# Patient Record
Sex: FEMALE | Race: WHITE | Employment: FULL TIME | URBAN - METROPOLITAN AREA
[De-identification: names, ages, dates, MRNs, and addresses within clinical notes are randomized per-mention and may not be internally consistent; named-entity substitution may affect disease eponyms.]

---

## 2020-02-21 ENCOUNTER — OFFICE VISIT (OUTPATIENT)
Dept: SURGERY | Age: 50
End: 2020-02-21

## 2020-02-21 VITALS
DIASTOLIC BLOOD PRESSURE: 48 MMHG | BODY MASS INDEX: 23.99 KG/M2 | HEART RATE: 86 BPM | HEIGHT: 65 IN | WEIGHT: 144 LBS | SYSTOLIC BLOOD PRESSURE: 121 MMHG

## 2020-02-21 DIAGNOSIS — D24.1 PAPILLOMA OF RIGHT BREAST: Primary | ICD-10-CM

## 2020-02-21 RX ORDER — LEVOTHYROXINE SODIUM 75 UG/1
TABLET ORAL
COMMUNITY
Start: 2019-12-30

## 2020-02-21 RX ORDER — FACIAL-BODY WIPES
10 EACH TOPICAL DAILY
COMMUNITY
End: 2020-05-29

## 2020-02-21 NOTE — PROGRESS NOTES
HISTORY OF PRESENT ILLNESS  Rosa Jorge is a 51 y.o. female. HPI NEW Patient presents for consultation at the request of Dr. Akin Blancas for RIGHT breast papilloma. She had no abnormal breast symptoms to report. Has healed well from the breast biopsy. This was detected from screening breast mri which led to [address] and biopsy. Family history-  Maternal aunt diagnosed with breast cancer in her [de-identified] and  from it. Maternal aunt diagnosed with breast cancer in her 52's.    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  2020 - right breast US at [facility] BI-RADS 4a  3/2019 - screening mammogram at [facility] BI-RADS 1  She also had recent diagnostic mammogram done at [facility]    Past Medical History:   Diagnosis Date    Abnormal mammogram     calficiations, q 6 mo mammogram. Dr. Alyssa Sanz    Constipation     Deviated nasal septum 2011    s/p repair Dr. Octavio Crowell FH: GI cancer     mother w GIST, MGF colon, MGM stomach    Shingles outbreak 13    left ear, face    Thyroid disease      Past Surgical History:   Procedure Laterality Date    NASAL SURG PROC UNLISTED  2011    septal reconstuction    OPEN RX Manteo Hoit I  2008    upper jaw surgery     Social History     Socioeconomic History    Marital status:      Spouse name: Kristel    Number of children: 3    Years of education: Not on file    Highest education level: Not on file   Occupational History    Occupation: Pediatrician     Employer: [employer] resource strain: Not on file    Food insecurity:     Worry: Not on file     Inability: Not on file    Transportation needs:     Medical: Not on file     Non-medical: Not on file   Tobacco Use    Smoking status: Never Smoker    Smokeless tobacco: Never Used   Substance and Sexual Activity    Alcohol use:  Yes Alcohol/week: 4.2 standard drinks     Types: 5 Glasses of wine per week     Comment: weekly    Drug use: Not on file    Sexual activity: Not on file   Lifestyle    Physical activity:     Days per week: Not on file     Minutes per session: Not on file    Stress: Not on file   Relationships    Social connections:     Talks on phone: Not on file     Gets together: Not on file     Attends Orthodoxy service: Not on file     Active member of club or organization: Not on file     Attends meetings of clubs or organizations: Not on file     Relationship status: Not on file    Intimate partner violence:     Fear of current or ex partner: Not on file     Emotionally abused: Not on file     Physically abused: Not on file     Forced sexual activity: Not on file   Other Topics Concern    Not on file   Social History Narrative    Not on file     OB History    No obstetric history on file. Obstetric Comments   Menarche:  14.5. LMP: ? Has IUD. # of Children:  3. Age at Delivery of First Child:  30.   Hysterectomy/oophorectomy:  NO/NO. Breast Bx:  yes. Hx of Breast Feeding:  yes. BCP:  yes. Hormone therapy:  no.                  Current Outpatient Medications:     levothyroxine (SYNTHROID) 75 mcg tablet, TK 1 T PO 5 DAYS A WEEK, Disp: , Rfl:     bisacodyL (DULCOLAX, BISACODYL,) 10 mg suppository, Insert 10 mg into rectum daily. , Disp: , Rfl:   Allergies   Allergen Reactions    Monosodium Glutamate Diarrhea    Morphine Itching     Review of Systems   Constitutional: Negative. HENT: Negative. Eyes: Negative. Respiratory: Negative. Cardiovascular: Negative. Gastrointestinal: Positive for constipation. Genitourinary: Negative. Musculoskeletal: Negative. Skin: Negative. Neurological: Negative. Endo/Heme/Allergies: Negative. Psychiatric/Behavioral: Negative. All other systems reviewed and are negative. Physical Exam  Vitals signs and nursing note reviewed.    Constitutional: Appearance: She is well-developed. HENT:      Head: Normocephalic. Neck:      Musculoskeletal: Neck supple. Thyroid: No thyromegaly. Cardiovascular:      Pulses: Normal pulses. Pulmonary:      Effort: Pulmonary effort is normal.   Abdominal:      Palpations: Abdomen is soft. Musculoskeletal: Normal range of motion. Lymphadenopathy:      Cervical: No cervical adenopathy. Skin:     General: Skin is warm and dry. Neurological:      Mental Status: She is alert and oriented to person, place, and time. BREAST ULTRASOUND, Preop planning  Indication:preop planning  right Breast 4:00    Technique: The area was scanned using a high-frequency linear-array near-field transducer  Findings: no clip seen  Impression: Biopsy site not visible with ultrasound  Disposition:  Will schedule wire loc excisional biospy  ASSESSMENT and PLAN    ICD-10-CM ICD-9-CM    1. Papilloma of right breast D24.1 217      Discussed risk of upgrade of papilloma on excision about 10-15%. She would like this excised. Will schedule her for right breast wire loc excisional biopsy. The procedure and risks were discussed. Risks include bleeding, bruising, scar, infection, need for more surgery. She understood and wished to proceed.

## 2020-02-21 NOTE — PATIENT INSTRUCTIONS

## 2020-02-26 ENCOUNTER — DOCUMENTATION ONLY (OUTPATIENT)
Dept: SURGERY | Age: 50
End: 2020-02-26

## 2020-02-26 NOTE — PROGRESS NOTES
Confirmed that mammogram images have been pushed over to pacs from [facility]. CDs will be placed into shredder.

## 2020-03-18 ENCOUNTER — DOCUMENTATION ONLY (OUTPATIENT)
Dept: SURGERY | Age: 50
End: 2020-03-18

## 2020-03-18 NOTE — PROGRESS NOTES
Spoke to patient and canceled her surgery for right breast excisional biopsy with right needle loc due to [address]. Patient is scheduled for a follow up appt on May 22, 2020 at 8:45am to evaluate and surgery rescheduled. She asked that possibly schedule on May 28, 2020. I will have saba look at it.

## 2020-05-12 DIAGNOSIS — D24.1 PAPILLOMA OF RIGHT BREAST: Primary | ICD-10-CM

## 2020-05-29 NOTE — PERIOP NOTES
[facility]  Ambulatory Surgery Unit  Pre-operative Instructions    Surgery/Procedure Date  06/4            Tentative Arrival Time 0630      1. On the day of your surgery/procedure, please report to the Ambulatory Surgery Unit Registration Desk and sign in at your designated time. The Ambulatory Surgery Unit is located in [facility] on the [facility] side of the [facility] across from the [address] [place] [address]. Please have all of your health insurance cards and a photo ID. 2. You must have someone with you to drive you home, as you should not drive a car for 24 hours following anesthesia. Please make arrangements for a responsible adult friend or family member to stay with you for at least the first 24 hours after your surgery. 3. Do not have anything to eat or drink (including water, gum, mints, coffee, juice) after 11:59 PM  06/3. This may not apply to medications prescribed by your physician. (Please note below the special instructions with medications to take the morning of surgery, if applicable.)    4. We recommend you do not drink any alcoholic beverages for 24 hours before and after your surgery. 5. Contact your surgeons office for instructions on the following medications: non-steroidal anti-inflammatory drugs (i.e. Advil, Aleve), vitamins, and supplements. (Some surgeons will want you to stop these medications prior to surgery and others may allow you to take them)   **If you are currently taking Plavix, Coumadin, Aspirin and/or other blood-thinning agents, contact your surgeon for instructions. ** Your surgeon will partner with the physician prescribing these medications to determine if it is safe to stop or if you need to continue taking. Please do not stop taking these medications without instructions from your surgeon.     6. In an effort to help prevent surgical site infection, we ask that you shower with an anti-bacterial soap (i.e. Dial/Safeguard, or the soap provided to you at your preadmission testing appointment) for 3 days prior to and on the morning of surgery, using a fresh towel after each shower. (Please begin this process with fresh bed linens.) Do not apply any lotions, powders, or deodorants after the shower on the day of your procedure. If applicable, please do not shave the operative site for 48 hours prior to surgery. 7. Wear comfortable clothes. Wear glasses instead of contacts. Do not bring any jewelry or money (other than copays or fees as instructed). Do not wear make-up, particularly mascara, the morning of your surgery. Do not wear nail polish, particularly if you are having foot /hand surgery. Wear your hair loose or down, no ponytails, buns, alejandra pins or clips. All body piercings must be removed. 8. You should understand that if you do not follow these instructions your surgery may be cancelled. If your physical condition changes (i.e. fever, cold or flu) please contact your surgeon as soon as possible. 9. It is important that you be on time. If a situation occurs where you may be late, or if you have any questions or problems, please call [phone].    10. Your surgery time may be subject to change. You will receive a phone call the day prior to surgery to confirm your arrival time. 11. Pediatric patients: please bring a change of clothes, diapers, bottle/sippy cup, pacifier, etc.      Special Instructions: Take all medications and inhalers, as prescribed, on the morning of surgery with a sip of water EXCEPT: n/a      Insulin Dependent Diabetic patients: Take your diabetic medications as prescribed the day before surgery. Hold all diabetic medications the day of surgery. If you are scheduled to arrive for surgery after 8:00 AM, and your AM blood sugar is >200, please call Ambulatory Surgery. In an effort to improve the efficiency, privacy, and safety for all of our Pre-op patients visitors are not allowed in the Holding area. Once you arrive and are registered your family/visitors will be asked to remain in your vehicle. The Pre-op staff will call you when they are ready for you to enter the building and will explain to you and your family/visitors that the Pre-op phase is beginning. At this time, if your procedure is outpatient, your family member will be asked to stay in their vehicle until the surgery is complete and you are ready for discharge. If you are going to be admitted after your surgery, once you are called to come inside the building, your family will be able to leave the parking lot. At this time the staff will also ask for your designated spokesperson information in the event that the physician or staff need to provide an update or obtain any pertinent information. The designated spokesperson will be notified if the physician needs to speak to family during the pre-operative phase.and/or in the post op phase. I understand a pre-operative phone call will be made to verify my surgery time. In the event that I am not available, I give permission for a message to be left on my answering service and/or with another person?       yes         ___________________      ___________________      ________________  (Signature of Patient)          (Witness)                   (Date and Time)

## 2020-05-31 ENCOUNTER — HOSPITAL ENCOUNTER (OUTPATIENT)
Dept: PREADMISSION TESTING | Age: 50
Discharge: HOME | End: 2020-05-31
Payer: COMMERCIAL

## 2020-05-31 PROCEDURE — 87635 SARS-COV-2 COVID-19 AMP PRB: CPT

## 2020-06-01 LAB
SARS-COV-2, COV2NT: NOT DETECTED
SPECIMEN SOURCE, FCOV2M: NORMAL

## 2020-06-03 ENCOUNTER — ANESTHESIA EVENT (OUTPATIENT)
Dept: SURGERY | Age: 50
End: 2020-06-03
Payer: COMMERCIAL

## 2020-06-04 ENCOUNTER — HOSPITAL ENCOUNTER (OUTPATIENT)
Age: 50
Setting detail: OUTPATIENT SURGERY
Discharge: HOME | End: 2020-06-04
Attending: SURGERY | Admitting: SURGERY
Payer: COMMERCIAL

## 2020-06-04 ENCOUNTER — ANESTHESIA (OUTPATIENT)
Dept: SURGERY | Age: 50
End: 2020-06-04
Payer: COMMERCIAL

## 2020-06-04 ENCOUNTER — APPOINTMENT (OUTPATIENT)
Dept: MAMMOGRAPHY | Age: 50
End: 2020-06-04
Attending: SURGERY
Payer: COMMERCIAL

## 2020-06-04 VITALS
OXYGEN SATURATION: 100 % | TEMPERATURE: 97.7 F | DIASTOLIC BLOOD PRESSURE: 59 MMHG | SYSTOLIC BLOOD PRESSURE: 123 MMHG | WEIGHT: 146 LBS | HEART RATE: 90 BPM | HEIGHT: 65 IN | BODY MASS INDEX: 24.32 KG/M2 | RESPIRATION RATE: 13 BRPM

## 2020-06-04 DIAGNOSIS — D24.1 PAPILLOMA OF RIGHT BREAST: ICD-10-CM

## 2020-06-04 DIAGNOSIS — R92.8 ABNORMAL MAMMOGRAM OF RIGHT BREAST: ICD-10-CM

## 2020-06-04 LAB — HCG UR QL: NEGATIVE

## 2020-06-04 PROCEDURE — 88307 TISSUE EXAM BY PATHOLOGIST: CPT

## 2020-06-04 PROCEDURE — 77030011640 HC PAD GRND REM COVD -A: Performed by: SURGERY

## 2020-06-04 PROCEDURE — 77030010507 HC ADH SKN DERMBND J&J -B: Performed by: SURGERY

## 2020-06-04 PROCEDURE — 77030011267 HC ELECTRD BLD COVD -A: Performed by: SURGERY

## 2020-06-04 PROCEDURE — 74011000250 HC RX REV CODE- 250: Performed by: RADIOLOGY

## 2020-06-04 PROCEDURE — 74011250636 HC RX REV CODE- 250/636: Performed by: NURSE ANESTHETIST, CERTIFIED REGISTERED

## 2020-06-04 PROCEDURE — 77030002996 HC SUT SLK J&J -A: Performed by: SURGERY

## 2020-06-04 PROCEDURE — 77030031139 HC SUT VCRL2 J&J -A: Performed by: SURGERY

## 2020-06-04 PROCEDURE — 74011250636 HC RX REV CODE- 250/636: Performed by: SURGERY

## 2020-06-04 PROCEDURE — 74011250637 HC RX REV CODE- 250/637: Performed by: SURGERY

## 2020-06-04 PROCEDURE — 76060000061 HC AMB SURG ANES 0.5 TO 1 HR: Performed by: SURGERY

## 2020-06-04 PROCEDURE — 77030020143 HC AIRWY LARYN INTUB CGAS -A: Performed by: NURSE ANESTHETIST, CERTIFIED REGISTERED

## 2020-06-04 PROCEDURE — 76210000034 HC AMBSU PH I REC 0.5 TO 1 HR: Performed by: SURGERY

## 2020-06-04 PROCEDURE — 74011250636 HC RX REV CODE- 250/636: Performed by: ANESTHESIOLOGY

## 2020-06-04 PROCEDURE — 77030021352 HC CBL LD SYS DISP COVD -B: Performed by: SURGERY

## 2020-06-04 PROCEDURE — 76030000000 HC AMB SURG OR TIME 0.5 TO 1: Performed by: SURGERY

## 2020-06-04 PROCEDURE — 74011000250 HC RX REV CODE- 250: Performed by: SURGERY

## 2020-06-04 PROCEDURE — 77030003594 MAM PLC NDL/WIRE/CLIP/SEED BREAST RT

## 2020-06-04 PROCEDURE — 77030018836 HC SOL IRR NACL ICUM -A: Performed by: SURGERY

## 2020-06-04 PROCEDURE — 77030002933 HC SUT MCRYL J&J -A: Performed by: SURGERY

## 2020-06-04 PROCEDURE — 81025 URINE PREGNANCY TEST: CPT

## 2020-06-04 PROCEDURE — 76210000046 HC AMBSU PH II REC FIRST 0.5 HR: Performed by: SURGERY

## 2020-06-04 PROCEDURE — 74011000250 HC RX REV CODE- 250: Performed by: NURSE ANESTHETIST, CERTIFIED REGISTERED

## 2020-06-04 RX ORDER — LIDOCAINE HYDROCHLORIDE 20 MG/ML
INJECTION, SOLUTION INFILTRATION; PERINEURAL
Status: DISCONTINUED
Start: 2020-06-04 | End: 2020-06-04 | Stop reason: HOSPADM

## 2020-06-04 RX ORDER — SODIUM CHLORIDE, SODIUM LACTATE, POTASSIUM CHLORIDE, CALCIUM CHLORIDE 600; 310; 30; 20 MG/100ML; MG/100ML; MG/100ML; MG/100ML
INJECTION, SOLUTION INTRAVENOUS
Status: DISCONTINUED | OUTPATIENT
Start: 2020-06-04 | End: 2020-06-04 | Stop reason: HOSPADM

## 2020-06-04 RX ORDER — SODIUM CHLORIDE 0.9 % (FLUSH) 0.9 %
5-40 SYRINGE (ML) INJECTION AS NEEDED
Status: DISCONTINUED | OUTPATIENT
Start: 2020-06-04 | End: 2020-06-04 | Stop reason: HOSPADM

## 2020-06-04 RX ORDER — LIDOCAINE HYDROCHLORIDE 10 MG/ML
4 INJECTION, SOLUTION EPIDURAL; INFILTRATION; INTRACAUDAL; PERINEURAL
Status: COMPLETED | OUTPATIENT
Start: 2020-06-04 | End: 2020-06-04

## 2020-06-04 RX ORDER — LIDOCAINE HYDROCHLORIDE 20 MG/ML
INJECTION, SOLUTION EPIDURAL; INFILTRATION; INTRACAUDAL; PERINEURAL AS NEEDED
Status: DISCONTINUED | OUTPATIENT
Start: 2020-06-04 | End: 2020-06-04 | Stop reason: HOSPADM

## 2020-06-04 RX ORDER — SODIUM CHLORIDE 0.9 % (FLUSH) 0.9 %
5-40 SYRINGE (ML) INJECTION EVERY 8 HOURS
Status: DISCONTINUED | OUTPATIENT
Start: 2020-06-04 | End: 2020-06-04 | Stop reason: HOSPADM

## 2020-06-04 RX ORDER — ONDANSETRON 2 MG/ML
INJECTION INTRAMUSCULAR; INTRAVENOUS AS NEEDED
Status: DISCONTINUED | OUTPATIENT
Start: 2020-06-04 | End: 2020-06-04 | Stop reason: HOSPADM

## 2020-06-04 RX ORDER — SODIUM CHLORIDE 9 MG/ML
INJECTION, SOLUTION INTRAVENOUS
Status: COMPLETED | OUTPATIENT
Start: 2020-06-04 | End: 2020-06-04

## 2020-06-04 RX ORDER — MIDAZOLAM HYDROCHLORIDE 1 MG/ML
INJECTION, SOLUTION INTRAMUSCULAR; INTRAVENOUS AS NEEDED
Status: DISCONTINUED | OUTPATIENT
Start: 2020-06-04 | End: 2020-06-04 | Stop reason: HOSPADM

## 2020-06-04 RX ORDER — SODIUM CHLORIDE, SODIUM LACTATE, POTASSIUM CHLORIDE, CALCIUM CHLORIDE 600; 310; 30; 20 MG/100ML; MG/100ML; MG/100ML; MG/100ML
25 INJECTION, SOLUTION INTRAVENOUS CONTINUOUS
Status: DISCONTINUED | OUTPATIENT
Start: 2020-06-04 | End: 2020-06-04 | Stop reason: HOSPADM

## 2020-06-04 RX ORDER — GLYCOPYRROLATE 0.2 MG/ML
INJECTION INTRAMUSCULAR; INTRAVENOUS AS NEEDED
Status: DISCONTINUED | OUTPATIENT
Start: 2020-06-04 | End: 2020-06-04 | Stop reason: HOSPADM

## 2020-06-04 RX ORDER — LIDOCAINE HYDROCHLORIDE 10 MG/ML
0.1 INJECTION, SOLUTION EPIDURAL; INFILTRATION; INTRACAUDAL; PERINEURAL AS NEEDED
Status: DISCONTINUED | OUTPATIENT
Start: 2020-06-04 | End: 2020-06-04 | Stop reason: HOSPADM

## 2020-06-04 RX ORDER — DEXAMETHASONE SODIUM PHOSPHATE 4 MG/ML
INJECTION, SOLUTION INTRA-ARTICULAR; INTRALESIONAL; INTRAMUSCULAR; INTRAVENOUS; SOFT TISSUE AS NEEDED
Status: DISCONTINUED | OUTPATIENT
Start: 2020-06-04 | End: 2020-06-04 | Stop reason: HOSPADM

## 2020-06-04 RX ORDER — HYDROMORPHONE HYDROCHLORIDE 1 MG/ML
0.2 INJECTION, SOLUTION INTRAMUSCULAR; INTRAVENOUS; SUBCUTANEOUS
Status: DISCONTINUED | OUTPATIENT
Start: 2020-06-04 | End: 2020-06-04 | Stop reason: HOSPADM

## 2020-06-04 RX ORDER — PROPOFOL 10 MG/ML
INJECTION, EMULSION INTRAVENOUS AS NEEDED
Status: DISCONTINUED | OUTPATIENT
Start: 2020-06-04 | End: 2020-06-04 | Stop reason: HOSPADM

## 2020-06-04 RX ORDER — OXYCODONE AND ACETAMINOPHEN 5; 325 MG/1; MG/1
1 TABLET ORAL
Qty: 30 TAB | Refills: 0 | Status: SHIPPED | OUTPATIENT
Start: 2020-06-04 | End: 2020-06-09

## 2020-06-04 RX ORDER — FENTANYL CITRATE 50 UG/ML
INJECTION, SOLUTION INTRAMUSCULAR; INTRAVENOUS AS NEEDED
Status: DISCONTINUED | OUTPATIENT
Start: 2020-06-04 | End: 2020-06-04 | Stop reason: HOSPADM

## 2020-06-04 RX ORDER — DIPHENHYDRAMINE HYDROCHLORIDE 50 MG/ML
12.5 INJECTION, SOLUTION INTRAMUSCULAR; INTRAVENOUS AS NEEDED
Status: DISCONTINUED | OUTPATIENT
Start: 2020-06-04 | End: 2020-06-04 | Stop reason: HOSPADM

## 2020-06-04 RX ORDER — FENTANYL CITRATE 50 UG/ML
25 INJECTION, SOLUTION INTRAMUSCULAR; INTRAVENOUS
Status: DISCONTINUED | OUTPATIENT
Start: 2020-06-04 | End: 2020-06-04 | Stop reason: HOSPADM

## 2020-06-04 RX ADMIN — SODIUM CHLORIDE, SODIUM LACTATE, POTASSIUM CHLORIDE, AND CALCIUM CHLORIDE 25 ML/HR: 600; 310; 30; 20 INJECTION, SOLUTION INTRAVENOUS at 09:03

## 2020-06-04 RX ADMIN — SODIUM CHLORIDE, POTASSIUM CHLORIDE, SODIUM LACTATE AND CALCIUM CHLORIDE: 600; 310; 30; 20 INJECTION, SOLUTION INTRAVENOUS at 09:59

## 2020-06-04 RX ADMIN — LIDOCAINE HYDROCHLORIDE 100 MG: 20 INJECTION, SOLUTION INTRAVENOUS at 10:03

## 2020-06-04 RX ADMIN — MIDAZOLAM HYDROCHLORIDE 2 MG: 1 INJECTION, SOLUTION INTRAMUSCULAR; INTRAVENOUS at 09:58

## 2020-06-04 RX ADMIN — LIDOCAINE HYDROCHLORIDE 4 ML: 10 INJECTION, SOLUTION EPIDURAL; INFILTRATION; INTRACAUDAL; PERINEURAL at 08:00

## 2020-06-04 RX ADMIN — FENTANYL CITRATE 50 MCG: 50 INJECTION, SOLUTION INTRAMUSCULAR; INTRAVENOUS at 10:45

## 2020-06-04 RX ADMIN — DEXAMETHASONE SODIUM PHOSPHATE 8 MG: 4 INJECTION, SOLUTION INTRAMUSCULAR; INTRAVENOUS at 10:10

## 2020-06-04 RX ADMIN — PROPOFOL 200 MG: 10 INJECTION, EMULSION INTRAVENOUS at 10:03

## 2020-06-04 RX ADMIN — Medication 3 AMPULE: at 07:07

## 2020-06-04 RX ADMIN — GLYCOPYRROLATE 0.2 MG: 0.2 INJECTION, SOLUTION INTRAMUSCULAR; INTRAVENOUS at 10:03

## 2020-06-04 RX ADMIN — FENTANYL CITRATE 50 MCG: 50 INJECTION, SOLUTION INTRAMUSCULAR; INTRAVENOUS at 10:11

## 2020-06-04 RX ADMIN — ONDANSETRON HYDROCHLORIDE 4 MG: 2 INJECTION, SOLUTION INTRAMUSCULAR; INTRAVENOUS at 10:10

## 2020-06-04 NOTE — PERIOP NOTES
Permission received to review discharge instructions and discuss private health information with , Kristel. Patient states that  will be with them for at least 24 hours following today's procedure. Mistral-Air warming blanket applied at this time. Set to appropriate setting that is comfortable to patient. Will continue to monitor.

## 2020-06-04 NOTE — PERIOP NOTES
Discharge instructions given over the phone to patient's  Kristel. Kristel verbalized understanding of instructions and follow up appointment - states he will be with patient at home for 24 hours.

## 2020-06-04 NOTE — ANESTHESIA POSTPROCEDURE EVALUATION
Procedure(s):  RIGHT BREAST EXCISIONAL BIOPSY WITH RIGHT NEEDLE LOCALIZATION. general    Anesthesia Post Evaluation        Patient location during evaluation: PACU  Note status: Adequate. Level of consciousness: responsive to verbal stimuli and sleepy but conscious  Pain management: satisfactory to patient  Airway patency: patent  Anesthetic complications: no  Cardiovascular status: acceptable  Respiratory status: acceptable  Hydration status: acceptable  Comments: +Post-Anesthesia Evaluation and Assessment    Patient: Moncho Contreras MRN: [id number]  SSN: [id number]   YOB: 1970  Age: 53 y.o. Sex: female      Cardiovascular Function/Vital Signs    BP (P) 123/59   Pulse (P) 90   Temp (P) 36.5 °C (97.7 °F)   Resp (P) 13   Ht 5' 4.5\" (1.638 m)   Wt 66.2 kg (146 lb)   SpO2 (P) 100%   BMI 24.67 kg/m²     Patient is status post Procedure(s):  RIGHT BREAST EXCISIONAL BIOPSY WITH RIGHT NEEDLE LOCALIZATION. Nausea/Vomiting: Controlled. Postoperative hydration reviewed and adequate. Pain:  Pain Scale 1: (P) Numeric (0 - 10) (06/04/20 1130)  Pain Intensity 1: (P) 0 (06/04/20 1130)   Managed. Neurological Status:   Neuro (WDL): (P) Within Defined Limits (06/04/20 1130)   At baseline. Mental Status and Level of Consciousness: Arousable. Pulmonary Status:   O2 Device: (P) Room air (06/04/20 1130)   Adequate oxygenation and airway patent. Complications related to anesthesia: None    Post-anesthesia assessment completed. No concerns. Signed By: Amber Fonseca MD    6/4/2020  Post anesthesia nausea and vomiting:  controlled      INITIAL Post-op Vital signs:   Vitals Value Taken Time   /59 6/4/2020 11:30 AM   Temp 36.4 °C (97.6 °F) 6/4/2020 10:59 AM   Pulse 80 6/4/2020 11:44 AM   Resp 11 6/4/2020 11:44 AM   SpO2 100 % 6/4/2020 11:44 AM   Vitals shown include unvalidated device data.

## 2020-06-04 NOTE — PERIOP NOTES
[facility]  1970  [id number]    Situation:  Verbal report given from: DIANA Moncada RN and CRNA  Procedure: Procedure(s):  RIGHT BREAST EXCISIONAL BIOPSY WITH RIGHT NEEDLE LOCALIZATION    Background:    Preoperative diagnosis: RIGHT BREAST PAPILLOMA    Postoperative diagnosis: RIGHT BREAST PAPILLOMA    :  Dr. Yessica Pereira    Assistant(s): Circ-1: Dwayne Colon  Scrub Tech-1: Lucille Lyles  Surg Asst-1: Cierra Wyatt    Specimens:   ID Type Source Tests Collected by Time Destination   1 : Right breast excisional biopsy. Marking stitches - Short = SuperiorLong = Lateral Needle Loc Breast  Mandi Nunez MD 6/4/2020 1025 Pathology       Assessment:  Intra-procedure medications         Anesthesia gave intra-procedure sedation and medications, see anesthesia flow sheet     Intravenous fluids: LR@ KVO     Vital signs stable.  Pt denies pain or chill      Recommendation:    Permission to share finding with  : yes

## 2020-06-04 NOTE — H&P
HISTORY OF PRESENT ILLNESS  Telma Brand is a 51 y.o. female. HPI NEW Patient presents for consultation at the request of Dr. Nicole Hunt for RIGHT breast papilloma. She had no abnormal breast symptoms to report. Has healed well from the breast biopsy. This was detected from screening breast mri which led to [address] and biopsy.     Family history-  Maternal aunt diagnosed with breast cancer in her [de-identified] and  from it.   Maternal aunt diagnosed with breast cancer in her 52's.     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  2020 - right breast US at [facility] BI-RADS 4a  3/2019 - screening mammogram at [facility] BI-RADS 1  She also had recent diagnostic mammogram done at [facility]          Past Medical History:   Diagnosis Date    Abnormal mammogram      calficiations, q 6 mo mammogram. Dr. Jh Manzo    Constipation      Deviated nasal septum 2011     s/p repair Dr. Marlin Reyez FH: GI cancer       mother w GIST, MGF colon, MGM stomach    Shingles outbreak 12     left ear, face    Thyroid disease              Past Surgical History:   Procedure Laterality Date    NASAL SURG PROC UNLISTED   2011     septal reconstuction    OPEN RX LEFORTE I   2008     upper jaw surgery      Social History            Socioeconomic History    Marital status:        Spouse name: Kristel    Number of children: 3    Years of education: Not on file    Highest education level: Not on file   Occupational History    Occupation: Pediatrician       Employer: [employer] resource strain: Not on file    Food insecurity:       Worry: Not on file       Inability: Not on file    Transportation needs:       Medical: Not on file       Non-medical: Not on file   Tobacco Use    Smoking status: Never Smoker    Smokeless tobacco: Never Used   Substance and Sexual Activity    Alcohol use: Yes       Alcohol/week: 4.2 standard drinks       Types: 5 Glasses of wine per week       Comment: weekly    Drug use: Not on file    Sexual activity: Not on file   Lifestyle    Physical activity:       Days per week: Not on file       Minutes per session: Not on file    Stress: Not on file   Relationships    Social connections:       Talks on phone: Not on file       Gets together: Not on file       Attends Episcopalian service: Not on file       Active member of club or organization: Not on file       Attends meetings of clubs or organizations: Not on file       Relationship status: Not on file    Intimate partner violence:       Fear of current or ex partner: Not on file       Emotionally abused: Not on file       Physically abused: Not on file       Forced sexual activity: Not on file   Other Topics Concern    Not on file   Social History Narrative    Not on file      OB History    No obstetric history on file.       Obstetric Comments   Menarche:  13.5. LMP: ? Has IUD. # of Children:  3. Age at Delivery of First Child:  30.   Hysterectomy/oophorectomy:  NO/NO. Breast Bx:  yes. Hx of Breast Feeding:  yes. BCP:  yes. Hormone therapy:  no.                       Current Outpatient Medications:     levothyroxine (SYNTHROID) 75 mcg tablet, TK 1 T PO 5 DAYS A WEEK, Disp: , Rfl:     bisacodyL (DULCOLAX, BISACODYL,) 10 mg suppository, Insert 10 mg into rectum daily. , Disp: , Rfl:        Allergies   Allergen Reactions    Monosodium Glutamate Diarrhea    Morphine Itching      Review of Systems   Constitutional: Negative. HENT: Negative. Eyes: Negative. Respiratory: Negative. Cardiovascular: Negative. Gastrointestinal: Positive for constipation. Genitourinary: Negative. Musculoskeletal: Negative. Skin: Negative. Neurological: Negative. Endo/Heme/Allergies: Negative. Psychiatric/Behavioral: Negative.     All other systems reviewed and are negative.        Physical Exam  Vitals signs and nursing note reviewed. Constitutional:       Appearance: She is well-developed. HENT:      Head: Normocephalic. Neck:      Musculoskeletal: Neck supple. Thyroid: No thyromegaly. Cardiovascular:      Pulses: Normal pulses. Pulmonary:      Effort: Pulmonary effort is normal.   Abdominal:      Palpations: Abdomen is soft. Musculoskeletal: Normal range of motion. Lymphadenopathy:      Cervical: No cervical adenopathy. Skin:     General: Skin is warm and dry. Neurological:      Mental Status: She is alert and oriented to person, place, and time.       BREAST ULTRASOUND, Preop planning  Indication:preop planning  right Breast 4:00    Technique: The area was scanned using a high-frequency linear-array near-field transducer  Findings: no clip seen  Impression: Biopsy site not visible with ultrasound  Disposition:  Will schedule wire loc excisional biospy  ASSESSMENT and PLAN      ICD-10-CM ICD-9-CM     1. Papilloma of right breast D24.1 217        Discussed risk of upgrade of papilloma on excision about 10-15%. She would like this excised. Will schedule her for right breast wire loc excisional biopsy. The procedure and risks were discussed. Risks include bleeding, bruising, scar, infection, need for more surgery. She understood and wished to proceed.

## 2020-06-04 NOTE — PERIOP NOTES
Pt. Alert. Denies pain or chill. Discharge instructions reviewed with caregiver and patient. Allowed and answered questions. Tolerating PO fluids. Both state ready for discharge.

## 2020-06-04 NOTE — DISCHARGE INSTRUCTIONS
Discharge Instructions from Dr. Layne Johnson    · I will call you with the pathology results, typically within 1 week from today. · You may shower, but no hot tubs, swimming pools, or baths until your incision is healed. · No heavy lifting with the affected extremity (nothing greater than 5 pounds), and limit its use for the next 4-5 days. · You may use an ice pack for comfort for the next couple of days, but do not place ice directly on the skin. Rather, use a towel or clothing to serve as a barrier between skin and ice to prevent injury. · If I placed a drain, follow the drain instructions provided, especially as you keep a record of the drain output. · Follow medication instructions carefully. No aspirin. May take tylenol or advil instead of narcotic. · Wear surgical bra and dressing for 24 hours, then remove. Wear supportive bra at all times. · You will have bruising and swelling  · Watch for signs of infection as listed below. · Redness  · Swelling  · Drainage from the incision or from your nipple that appears infected  · Fever over 101.5 degrees for consecutive readings, or over 99.5 if you are currently undergoing chemotherapy. · Call our office (number is below) for a follow-up appointment. · If you have any problems, our phone number is [phone]    DO NOT TAKE TYLENOL/ACETAMINOPHEN WITH PERCOCET, [address], [address] TAKE NARCOTIC PAIN MEDICATIONS WITH FOOD     Narcotics tend to be constipating, we suggest taking a stool softener such as Colace or Miralax (follow package instructions). DO NOT DRIVE WHILE TAKING NARCOTIC PAIN MEDICATIONS. DO NOT TAKE SLEEPING MEDICATIONS OR ANTIANXIETY MEDICATIONS WHILE TAKING NARCOTIC PAIN MEDICATIONS,  ESPECIALLY THE NIGHT OF ANESTHESIA! CPAP PATIENTS BE SURE TO WEAR MACHINE WHENEVER NAPPING OR SLEEPING!     DISCHARGE SUMMARY from Nurse    The following personal items collected during your admission are returned to you:   Dental Appliance: Dental Appliances: None  Vision: Visual Aid: None  Hearing Aid:    Jewelry: Jewelry: None  Clothing: Clothing: With patient  Other Valuables: Other Valuables: Cell Phone, Other (comment)(Cell phone & books in Blue bag in PACU)  Valuables sent to safe:        PATIENT INSTRUCTIONS:    After General Anesthesia or Intravenous Sedation, for 24 hours or while taking prescription Narcotics:        Someone should be with you for the next 24 hours. For your own safety, a responsible adult must drive you home. · Limit your activities  · Recommended activity: Rest today, up with assistance today. Do not climb stairs or shower unattended for the next 24 hours. · Please start with a soft bland diet and advance as tolerated (no nausea) to regular diet. · If you have a sore throat you should try the following: fluids, warm salt water gargles, or throat lozenges. If it does not improve after several days please follow up with your primary physician. · Do not drive and operate hazardous machinery  · Do not make important personal or business decisions  · Do  not drink alcoholic beverages  · If you have not urinated within 8 hours after discharge, please contact your surgeon on call. Report the following to your surgeon:  · Excessive pain, swelling, redness or odor of or around the surgical area  · Temperature over 100.5  · Nausea and vomiting lasting longer than 4 hours or if unable to take medications  · Any signs of decreased circulation or nerve impairment to extremity: change in color, persistent  numbness, tingling, coldness or increase pain      · You will receive a Post Operative Call from one of the Recovery Room Nurses on the day after your surgery to check on you. It is very important for us to know how you are recovering after your surgery. If you have an issue or need to speak with someone, please call your surgeon, do not wait for the post operative call.     · You may receive an e-mail or letter in the mail from Ashok Anderson regarding your experience with us in the Ambulatory Surgery Unit. Your feedback is valuable to us and we appreciate your participation in the survey. · If the above instructions are not adequate or you are having problems after your surgery, call the physician at their office number. · We wish you a speedy recovery ? What to do at Home:      *  Please give a list of your current medications to your Primary Care Provider. *  Please update this list whenever your medications are discontinued, doses are      changed, or new medications (including over-the-counter products) are added. *  Please carry medication information at all times in case of emergency situations. If you have not received your influenza and/or pneumococcal vaccine, please follow up with your primary care physician. The discharge information has been reviewed with the patient and caregiver. The patient and caregiver verbalized understanding. TO PREVENT AN INFECTION      1. [address] YOUR HANDS     To prevent infection, good handwashing is the most important thing you or your caregiver can do.  Wash your hands with soap and water or use the hand  we gave you before you touch any wounds. 2. SHOWER     Use the antibacterial soap we gave you when you take a shower.  Shower with this soap until your wounds are healed.  To reach all areas of your body, you may need someone to help you.  Dont forget to clean your belly button with every shower. 3.  USE CLEAN SHEETS     Use freshly cleaned sheets on your bed after surgery.  To keep the surgery site clean, do not allow pets to sleep with you while your wound is still healing. 4. STOP SMOKING     Stop smoking, or at least cut back on smoking     Smoking slows your healing. 5.  CONTROL YOUR BLOOD SUGAR     High blood sugars slow wound healing.      If you are diabetic, control your blood sugar levels before and after your surgery. Patient Education   Learning About Coronavirus [phone])  Coronavirus [phone]): Overview  What is coronavirus ([id number])? The coronavirus disease (COVID-19) is caused by a virus. It is an illness that was first found in [place], [place], in December 2019. It has since spread worldwide. The virus can cause fever, cough, and trouble breathing. In severe cases, it can cause pneumonia and make it hard to breathe without help. It can cause death. Coronaviruses are a large group of viruses. They cause the common cold. They also cause more serious illnesses like Middle East respiratory syndrome (MERS) and severe acute respiratory syndrome (SARS). COVID-19 is caused by a novel coronavirus. That means it's a new type that has not been seen in people before. This virus spreads person-to-person through droplets from coughing and sneezing. It can also spread when you are close to someone who is infected. And it can spread when you touch something that has the virus on it, such as a doorknob or a tabletop. What can you do to protect yourself from coronavirus (COVID-19)? The best way to protect yourself from getting sick is to:  · Avoid areas where there is an outbreak. · Avoid contact with people who may be infected. · Wash your hands often with soap or alcohol-based hand sanitizers. · Avoid crowds and try to stay at least 6 feet away from other people. · Wash your hands often, especially after you cough or sneeze. Use soap and water, and scrub for at least 20 seconds. If soap and water aren't available, use an alcohol-based hand . · Avoid touching your mouth, nose, and eyes. What can you do to avoid spreading the virus to others? To help avoid spreading the virus to others:  · Cover your mouth with a tissue when you cough or sneeze. Then throw the tissue in the trash. · Use a disinfectant to clean things that you touch often.   · Stay home if you are sick or have been exposed to the virus. Don't go to school, work, or public areas. And don't use public transportation. · If you are sick:  ? Leave your home only if you need to get medical care. But call the doctor's office first so they know you're coming. And wear a face mask, if you have one.  ? If you have a face mask, wear it whenever you're around other people. It can help stop the spread of the virus when you cough or sneeze. ? Clean and disinfect your home every day. Use household  and disinfectant wipes or sprays. Take special care to clean things that you grab with your hands. These include doorknobs, remote controls, phones, and handles on your refrigerator and microwave. And don't forget countertops, tabletops, bathrooms, and computer keyboards. When to call for help  Call 911 anytime you think you may need emergency care. For example, call if:  · You have severe trouble breathing. (You can't talk at all.)  · You have constant chest pain or pressure. · You are severely dizzy or lightheaded. · You are confused or can't think clearly. · Your face and lips have a blue color. · You pass out (lose consciousness) or are very hard to wake up. Call your doctor now if you develop symptoms such as:  · Shortness of breath. · Fever. · Cough. If you need to get care, call ahead to the doctor's office for instructions before you go. Make sure you wear a face mask, if you have one, to prevent exposing other people to the virus. Where can you get the latest information? The following health organizations are tracking and studying this virus. Their websites contain the most up-to-date information. Lexi Polo also learn what to do if you think you may have been exposed to the virus. · U.S. Centers for Disease Control and Prevention (CDC): The CDC provides updated news about the disease and travel advice. The website also tells you how to prevent the spread of infection.  www.cdc.gov  · World Health Organization [facility]): WHO offers information about the virus outbreaks. WHO also has travel advice. www.who.int  Current as of: April 1, 2020               Content Version: 12.4  © 2006-2020 Healthwise, Incorporated. Care instructions adapted under license by your healthcare professional. If you have questions about a medical condition or this instruction, always ask your healthcare professional. [address] any warranty or liability for your use of this information.

## 2020-06-04 NOTE — ANESTHESIA PREPROCEDURE EVALUATION
Relevant Problems   No relevant active problems       Anesthetic History   No history of anesthetic complications            Review of Systems / Medical History  Patient summary reviewed, nursing notes reviewed and pertinent labs reviewed    Pulmonary  Within defined limits                 Neuro/Psych   Within defined limits           Cardiovascular  Within defined limits                Exercise tolerance: >4 METS     GI/Hepatic/Renal  Within defined limits              Endo/Other  Within defined limits    Hypothyroidism       Other Findings              Physical Exam    Airway  Mallampati: II  TM Distance: 4 - 6 cm  Neck ROM: normal range of motion   Mouth opening: Normal     Cardiovascular  Regular rate and rhythm,  S1 and S2 normal,  no murmur, click, rub, or gallop             Dental  No notable dental hx       Pulmonary  Breath sounds clear to auscultation               Abdominal  GI exam deferred       Other Findings            Anesthetic Plan    ASA: 2  Anesthesia type: general    Monitoring Plan: BIS      Induction: Intravenous  Anesthetic plan and risks discussed with: Patient      LMA

## 2020-06-04 NOTE — PERIOP NOTES
0730: Pt transported to [facility] via wheelchair and this nurse. 0840: Pt back from [facility].

## 2020-06-04 NOTE — BRIEF OP NOTE
Brief Postoperative Note    Patient: Vivianne Burkitt  YOB: 1970  MRN: [id number]    Date of Procedure: 6/4/2020     Pre-Op Diagnosis: RIGHT BREAST PAPILLOMA    Post-Op Diagnosis: Same as preoperative diagnosis. Procedure(s):  RIGHT BREAST EXCISIONAL BIOPSY WITH RIGHT NEEDLE LOCALIZATION    Surgeon(s): Yahir Chinchilla MD    Surgical Assistant: None    Anesthesia: General     Estimated Blood Loss (mL): Minimal    Complications: None    Specimens:   ID Type Source Tests Collected by Time Destination   1 : Right breast excisional biopsy.  Marking stitches - Short = SuperiorLong = Lateral Needle Loc Breast  Yahir Chinchilla MD 6/4/2020 1025 Pathology        Implants: none   Drains: * No LDAs found *    Findings: clip and wire in specimen radiograph    Electronically Signed by Arjun Caldera MD on 6/4/2020 at 10:50 AM  Dictated stat

## 2020-06-04 NOTE — OP NOTES
[address]  OPERATIVE REPORT    Name:  Orlando Springer  MR#:  [id number]  :  1970  ACCOUNT #:  [de-identified]  DATE OF SERVICE:  2020      PREOPERATIVE DIAGNOSIS:  Right breast papilloma. POSTOPERATIVE DIAGNOSIS:  Right breast papilloma. PROCEDURE PERFORMED:  Right breast wire localized excisional biopsy. SURGEON:  Kimi Henderson MD    ASSISTANT:  Cande Jacques    ANESTHESIA:  General.    COMPLICATIONS:  None. SPECIMENS REMOVED:  Right breast excisional biopsy. IMPLANTS:  None. ESTIMATED BLOOD LOSS:  Minimal.    DRAINS:  No drains. FINDINGS:  Clip and wire in specimen radiograph. INDICATIONS FOR PROCEDURE:  A 79-year-old female with a right breast papilloma due to risk of upgrade, an excisional biopsy wire localized was recommended. PROCEDURE IN DETAIL:  The patient initially went to [facility] imaging, where wire localization was performed, she tolerated this well. She went to the preop holding area, where surgical site was marked by surgeon. Informed consent was obtained. She was taken to the operating room, laid in supine position, where LMA anesthesia was induced. Right breast prepped and draped in the usual fashion and a time-out was performed. The films were hung in the room. Periareolar incision was made with a 10-blade after injection of 10 mL of local anesthetic. Bovie cautery was used to tunnel towards the wire. The wire was pulled through the skin using a hemostat. Curved Espinoza scissors were used to excise the clip and the wire down to the chest wall. This was excised and marked short stitch superior and long stitch lateral.  X-ray confirmation showed clip and wire in the specimen. Next the area was irrigated, additional 30 more mL of local anesthetic was injected in the chest wall, skin and subcutaneous tissue.   The tissues were circumferentially undermined and closed with interrupted 2-0 Vicryl, the skin was closed with interrupted 3-0 Vicryl and 4-0 subcuticular Monocryl. Skin glue was placed on the incision as well as a pressure dressing and a bra. All sponge, needle and instrument counts were correct. The patient went to recovery in stable condition.         MD CARLINE [email or web address]  D:  06/04/2020 10:53  T:  JOB #:  [id number]

## 2020-06-08 NOTE — PROGRESS NOTES
Spoke with dr. Don Landry  Focal atypia, papillomas  Doing well   Has bruising.    Happy with call  Will call her 6/15 for virtual postop

## 2020-06-15 ENCOUNTER — DOCUMENTATION ONLY (OUTPATIENT)
Dept: SURGERY | Age: 50
End: 2020-06-15

## 2020-06-15 ENCOUNTER — VIRTUAL VISIT (OUTPATIENT)
Dept: SURGERY | Age: 50
End: 2020-06-15

## 2020-06-15 DIAGNOSIS — D24.9 PAPILLOMA OF BREAST, UNSPECIFIED LATERALITY: ICD-10-CM

## 2020-06-15 DIAGNOSIS — N60.99 ATYPICAL HYPERPLASIA OF BREAST: Primary | ICD-10-CM

## 2020-06-16 NOTE — PROGRESS NOTES
Ann Delacruz is a 53 y.o. female evaluated via audio only technology on 6/15/2020. Consent: She and/or her health care decision maker is aware that she may receive a bill for this audio only encounter, depending on her insurance coverage, and has provided verbal consent to proceed: Yes    I communicated with the patient and/or health care decision maker about the nature and details of the following:  Assessment & Plan:   Diagnoses and all orders for this visit:    1. Atypical hyperplasia of breast    2. Papilloma of breast, unspecified laterality    Patient doing well postop. Discussed risk reduction meds she does not want this. Will qualify for mri in 6 months and will see us after this. 12  Subjective: Ann Delacruz is a 53 y.o. female who was seen for No chief complaint on file. Prior to Admission medications    Medication Sig Start Date End Date Taking? Authorizing Provider   levothyroxine (SYNTHROID) 75 mcg tablet TK 1 T PO 5 DAYS A WEEK 12/30/19   Provider, Historical     Allergies   Allergen Reactions    Morphine Itching       Patient Active Problem List   Diagnosis Code    Deviated nasal septum J34.2    FH: GI cancer Z80.0    Shingles outbreak B02.9    Abnormal mammogram R92.8       ROS    I affirm this is a Patient-Initiated Episode with a Patient who has not had a related appointment within my department in the past 7 days or scheduled within the next 24 hours.     Total Time: minutes: 5-10 minutes    Note: not billable if this call serves to triage the patient into an appointment for the relevant concern      Romeo Valenzuela MD

## 2020-12-07 ENCOUNTER — TELEPHONE (OUTPATIENT)
Dept: SURGERY | Age: 50
End: 2020-12-07

## 2020-12-07 ENCOUNTER — DOCUMENTATION ONLY (OUTPATIENT)
Dept: SURGERY | Age: 50
End: 2020-12-07

## 2020-12-07 NOTE — PROGRESS NOTES
Received a call from [facility] MRI department. I called and spoke to Casper Barnes. Patient's MRI was denied by her insurance. A peer to peer needs to be done. Patient has ADH and needed a 6 month follow up MRI to assess the ADH. Peer to Peer needs to be called within 24 hours or we will need to call Casper Barnes back to have a new case opened.       BC/BS peer to peer # [phone]  ID# [id number]        Casper Barnes at [facility] [phone]

## 2021-05-21 ENCOUNTER — APPOINTMENT (OUTPATIENT)
Dept: PHYSICAL THERAPY | Age: 51
End: 2021-05-21

## 2021-07-02 ENCOUNTER — HOSPITAL ENCOUNTER (OUTPATIENT)
Dept: PHYSICAL THERAPY | Age: 51
Discharge: HOME | End: 2021-07-02
Payer: COMMERCIAL

## 2021-07-02 PROCEDURE — 97535 SELF CARE MNGMENT TRAINING: CPT | Performed by: PHYSICAL MEDICINE & REHABILITATION

## 2021-07-02 PROCEDURE — 97162 PT EVAL MOD COMPLEX 30 MIN: CPT | Performed by: PHYSICAL MEDICINE & REHABILITATION

## 2021-07-02 PROCEDURE — 97110 THERAPEUTIC EXERCISES: CPT | Performed by: PHYSICAL MEDICINE & REHABILITATION

## 2021-07-02 NOTE — PROGRESS NOTES
PT INITIAL EVALUATION NOTE 2-15    Patient Name: Kvng Harmon  Date:2021  : 1970  [x]  Patient  Verified  Payor: BLUE CROSS / Plan: 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 She will have a BM once a week with 1 dose of 10mg Dulcolax. She has tried Linzess and Amitiza with no results. No improvement with magnesium, senna and psyllium. She complains of bloating with constipation but no pain. Stool is generally BSS 4 or softer. Addionally she reports difficulty evacuating stool on the toilet, she has to rock her pelvis back into a posterior pelvic tilt on the toilet in order to clear her rectum no matter how hard or soft her stool is. Pt reports symptoms of POTS for years. Pt denies pain with IC. Not aware if she has reached menopause yet, she has no bleeding due to IUD. Pt reports BREANA with cough/sneeze occaionally. No UUI. She is double voiding to fully empty bladder regularly.        PMH  3    1st 3rd degree with forceps    PLOF:  Chronic constipation, no difficulty clearing rectum, no BREANA  Mechanism of Injury: none  Previous Treatment/Compliance: none  PMHx/Surgical Hx: breast lumpectomy, jaw surgery  Work Hx: pediatrician  Living Situation: spouse, 2 children  Pt Goals: no difficulty with BM, no BREANA  Barriers: none  Motivation: good  Substance use: none  Cognition: A & O x 4       OBJECTIVE/EXAMINATION    External Pelvic Exam  Non tender through external pelvic clock  Mild anterior vaginal wall prolapse with repeated sustained valsalva in supine  Active contraction: present  Active relaxation: present  Involuntary relaxation: present    Internal Vaginal Exam:  Layer 1 Non tender  Layer 2/3 Non tender  Bladder neck mobility: hypermobile    PERFECT SCORE CHART  P =  Power (Laycock Scale Grade 0-5)  3+  E =  Endurance (How long pt holds max contraction)  4  R =  Repetitions (How many times the repeats holds)  4  F =  Fast Twitch (How many 1 second contractions in 10 seconds) 7  E =  Elevation (Lift of post vaginal wall toward pubic bone)  Present   C =  Coordinated cocontraction of transverse abdominus Absent  T = Timing (squeeze and lift with cough) Absent      15 min Therapeutic Exercise:  [] See flow sheet :  PFMT with emphasis on up/down training through full ROM, breath coordination, avoid valsalva   Rationale: increase strength and improve coordination to improve the patients ability to eliminate BREANA with ADLs. 30 min Self Care/Home Management:  []  See flow sheet : Patient instructed in the role of physical therapy in the evaluation and treatment of pelvic floor dysfunction, applicable treatment modalities discussed. Expectations for regular home exercise participation and expected visit frequency in to achieve the patient's goals were discussed. Patient instructed in pelvic floor anatomy and function including levator ani, puborectalis and superficial pelvic  musculature. Patient was provided dietary information to improve stool quality including increasing soluble fiber intake (Bran Buds), probiotics (Activia yogurt) and increased water intake (6-8 glasses a day). Pt instructed in use of [place] stool chart to track progress.      Patient educated in proper defecation posture and technique including use of Squatty Potty for better puborectalis ms relaxation. Pt to avoid straining to pass stool in order to decrease strain on pelvic floor. Patient educated in bladder irritants, good bladder habits. Rationale: improve coordination and improve patient understanding, change daily habits  to improve the patients ability to improve stool motility, reduce time on toilet. With   [x] TE   [] TA   [] Neuro   [] SC   [] other: Patient Education: [x] Review HEP    [x] Progressed/Changed HEP based on:   [] positioning   [] body mechanics   [] transfers   [] heat/ice application    [] other:        Other Objective/Functional Measures:     [place] Female Pelvic Floor Inventory   Bowel: Moderate Bother  Bladder: Moderate Bother  Prolapse: No Bother  Sexual Function: No Bother    Pain Level (0-10 scale) post treatment:  0       ASSESSMENT:  Pt with history of constipation due to slow transit presents with newer onset stress urinary incontinence, poor rectal clearing, double voiding to clear bladder. She presents with decreased strength, endurance and coordination of pelvic floor musculature. Assessment is ongoing. She will benefit from skilled PT services to address her limitations and achieve her functional goals as stated on the plan of care.       [x]  See Plan of Thiago Mason PT, MSPT 7/2/2021

## 2021-07-05 NOTE — PROGRESS NOTES
Physical Therapy at [facility],   a part of  [facility], [address]  Phone: [phone]  Fax: [phone]    Plan of Care/ Statement of Necessity for Physical Therapy Services 2-15    Patient name: Michaela Stewart  : 1970  Provider#: [id number]  Referral source: Ginette Holter*      Medical/Treatment Diagnosis: Slow transit constipation [K59.01]  Irritable bowel syndrome without diarrhea [K58.9]  Disorder of muscle, unspecified [M62.9]     Prior Hospitalization: see medical history     Comorbidities: POTS, thyroid dysfunction  Prior Level of Function: no BREANA  Medications: Verified on Patient Summary List    Start of Care: 22      Onset Date: 6mos+       The Plan of Care and following information is based on the information from the initial evaluation. Assessment/ key information: Pt with history of constipation due to slow transit presents with newer onset stress urinary incontinence, poor rectal clearing, double voiding to clear bladder. She presents with decreased strength, endurance and coordination of pelvic floor musculature. Assessment is ongoing. She will benefit from skilled PT services to address her limitations and achieve her functional goals as stated on the plan of care. Evaluation Complexity History MEDIUM  Complexity : 1-2 comorbidities / personal factors will impact the outcome/ POC ; Examination MEDIUM Complexity : 3 Standardized tests and measures addressing body structure, function, activity limitation and / or participation in recreation  ;Presentation MEDIUM Complexity : Evolving with changing characteristics  ; Clinical Decision Making MEDIUM Complexity : FOTO score of 26-74  Overall Complexity Rating: MEDIUM    Problem List: pain affecting function, decrease strength, decrease ADL/ functional abilitiies, decrease activity tolerance, decrease flexibility/ joint mobility and other involuntary urine loss, difficulty clearing rectum    Treatment Plan may include any combination of the following: Therapeutic exercise, Therapeutic activities, Neuromuscular re-education, Physical agent/modality, Manual therapy, Patient education and Self Care training  Patient / Family readiness to learn indicated by: asking questions  Persons(s) to be included in education: patient (P)  Barriers to Learning/Limitations: None  Patient Goal (s): no urinating with coughingI, normal bowel function  Patient Self Reported Health Status: good  Rehabilitation Potential: good    Short Term Goals: To be accomplished in 6 weeks:  Patient will be independent with a progressive home exercise program    Patient will demonstrate & utilized pelvic floor ms protection techniques   Patient will demonstrate improved PFM strength to 3+/5 bilaterally in order to decrease UI symptoms   Patient will be independent with urge suppression techniques, bladder irritant elmination   Patient will be independent with progressive body scan relaxation program     Long Term Goals: To be accomplished in 12 weeks:  Patient will demonstrate 4/5 PFM strength bilaterally in order to eliminate UI symptoms. Patient will demonstrate 10 second PFM holds at 4/5 in order to elminate UI symptoms. Patient will have no loss of urine with cough/sneeze. Frequency / Duration: Patient to be seen 1 times per week for 6-12 weeks. Patient/ Caregiver education and instruction: self care    [x]  Plan of care has been reviewed with CANDELARIA Lawson PT, MSPT  7/5/2021     ________________________________________________________________________    I certify that the above Therapy Services are being furnished while the patient is under my care. I agree with the treatment plan and certify that this therapy is necessary.     Physician's Signature:____________________  Date:____________Time: _________      Coletta Bloch, P*

## 2021-09-30 NOTE — PROGRESS NOTES
Physical Therapy at [facility],   a part of  [facility]  [place]  [facility], [address] [place] [address] Drive  Phone: [phone]  Fax: [phone]    Discharge Summary  2-15    Patient name: Geno Burrell  : 1970  Provider#: [id number]  Referral source: Lauren Madrigal*      Medical/Treatment Diagnosis: Slow transit constipation [K59.01]  Irritable bowel syndrome without diarrhea [K58.9]  Disorder of muscle, unspecified [M62.9]     Prior Hospitalization: see medical history     Comorbidities: See Plan of Care  Prior Level of Function:See Plan of Care  Medications: Verified on Patient Summary List    Start of Care: 21      Onset Date:  6mos   Visits from Start of Care: 1     Missed Visits: 0  Reporting Period : 21 - 21      ASSESSMENT/SUMMARY OF CARE: Pt was referred to pelvic PT for evaluation and treatment of constipation, BREANA. She was seen for 1 visit, did not return. Current functional status is unknown. She is discharged with no goals met.                RECOMMENDATIONS:  [x]Discontinue therapy: []Patient has reached or is progressing toward set goals      [x]Patient is non-compliant or has abdicated      []Due to lack of appreciable progress towards set goals      []Other    Jose Aburto, PT, MSPT     2021

## 2022-02-01 ENCOUNTER — OFFICE VISIT (OUTPATIENT)
Dept: SURGERY | Age: 52
End: 2022-02-01
Payer: COMMERCIAL

## 2022-02-01 VITALS
HEIGHT: 65 IN | SYSTOLIC BLOOD PRESSURE: 104 MMHG | DIASTOLIC BLOOD PRESSURE: 47 MMHG | BODY MASS INDEX: 24.99 KG/M2 | WEIGHT: 150 LBS | HEART RATE: 77 BPM

## 2022-02-01 DIAGNOSIS — Z91.89 AT HIGH RISK FOR BREAST CANCER: ICD-10-CM

## 2022-02-01 DIAGNOSIS — N60.99 ATYPICAL HYPERPLASIA OF BREAST: Primary | ICD-10-CM

## 2022-02-01 PROCEDURE — 99213 OFFICE O/P EST LOW 20 MIN: CPT | Performed by: NURSE PRACTITIONER

## 2022-02-01 NOTE — PATIENT INSTRUCTIONS

## 2022-02-01 NOTE — PROGRESS NOTES
HISTORY OF PRESENT ILLNESS  Tayler Cuellar is a 49 y.o. female. HPI Established patient presents for high risk follow-up due to her personal history of RIGHT breast ADH. Denies breast mass, skin changes, nipple discharge and pain. Breast history -  Referring - Drake Nieves MD - [facility]  2020 - breast MRI in [place] 3-4:00 a 6 mm lobulated mass birads 4.  2020 - RIGHT breast US at [facility] BI-RADS 4a  2020 - RIGHT breast 3 OC mass biopsy revealed sclerosing papilloma without atypia  2020 - Right breast, oriented excisional biopsy with needle localization - Dr. Cephus [place] intraductal papillomas with benign epithelial hyperplasia. Atypical ductal hyperplasia, focal.   Fibrocystic changes including columnar cell change, usual ductal   hyperplasia and benign calcifications. Negative for carcinoma. Family history -  Maternal aunt diagnosed with breast cancer in her [de-identified] and  from it. Maternal aunt diagnosed with breast cancer in her 52's. OB History    No obstetric history on file. Obstetric Comments   Menarche:  [age]. LMP: ? Has IUD. # of Children:  3. Age at Delivery of First Child:  30.   Hysterectomy/oophorectomy:  NO/NO. Breast Bx:  yes. Hx of Breast Feeding:  yes. BCP:  yes. Hormone therapy:  no.                    Past Surgical History:   Procedure Laterality Date    HX BLEPHAROPLASTY Bilateral 2018    HX BREAST BIOPSY Right 2020    RIGHT BREAST EXCISIONAL BIOPSY WITH RIGHT NEEDLE LOCALIZATION performed by Roxanna Aldridge MD at [facility] AMBULATORY OR    NASAL SURG [address] UNLISTED  2011    septal reconstuction    OPEN RX LEFORTE I  2008    upper jaw surgery                 ROS    Physical Exam  Constitutional:       Appearance: Normal appearance. Chest:   Breasts:      Right: No mass, nipple discharge, skin change, tenderness, axillary adenopathy or supraclavicular adenopathy.       Left: No mass, nipple discharge, skin change, tenderness, axillary adenopathy or supraclavicular adenopathy. Musculoskeletal:      Comments: FROM - UE x 2   Lymphadenopathy:      Upper Body:      Right upper body: No supraclavicular or axillary adenopathy. Left upper body: No supraclavicular or axillary adenopathy. Neurological:      Mental Status: She is alert. Psychiatric:         Attention and Perception: Attention normal.         Mood and Affect: Mood normal.         Speech: Speech normal.         Behavior: Behavior normal.         Visit Vitals  BP (!) 104/47 (BP 1 Location: Right arm, BP Patient Position: Sitting, BP Cuff Size: Adult)   Pulse 77   Ht 5' 4.5\" (1.638 m)   Wt 150 lb (68 kg)   BMI 25.35 kg/m²       ASSESSMENT and PLAN    ICD-10-CM ICD-9-CM    1. Atypical hyperplasia of breast  N60.99 611.1    2. At high risk for breast cancer  Z91.89 V49.89 MRI BREAST BI W WO CONT        Normal exam with no evidence of malignancy. Mammogram - done at [facility]. Spaces out 6 months from breast MRI. Reports last imaging was normal.  Will request report. Breast MRI - 2/2021 at [place] [address] 2. Order given for breast MRI this year. RTC in 1 year or sooner PRN. She is comfortable with this plan. All questions answered and she stated understanding. Total time spent for this patient - 20 minutes.

## 2022-12-13 ENCOUNTER — OFFICE VISIT (OUTPATIENT)
Dept: SURGERY | Age: 52
End: 2022-12-13
Payer: COMMERCIAL

## 2022-12-13 VITALS
HEART RATE: 77 BPM | DIASTOLIC BLOOD PRESSURE: 59 MMHG | BODY MASS INDEX: 25.49 KG/M2 | HEIGHT: 65 IN | WEIGHT: 153 LBS | SYSTOLIC BLOOD PRESSURE: 115 MMHG

## 2022-12-13 DIAGNOSIS — Z91.89 AT HIGH RISK FOR BREAST CANCER: Primary | ICD-10-CM

## 2022-12-13 DIAGNOSIS — N60.12 FIBROCYSTIC BREAST CHANGES OF BOTH BREASTS: ICD-10-CM

## 2022-12-13 DIAGNOSIS — Z80.3 FAMILY HISTORY OF BREAST CANCER: ICD-10-CM

## 2022-12-13 DIAGNOSIS — N60.11 FIBROCYSTIC BREAST CHANGES OF BOTH BREASTS: ICD-10-CM

## 2022-12-13 PROCEDURE — 99213 OFFICE O/P EST LOW 20 MIN: CPT | Performed by: NURSE PRACTITIONER

## 2022-12-13 NOTE — PROGRESS NOTES
HISTORY OF PRESENT ILLNESS  Batool Vicente is a 47 y.o. female. HPI Established patient presents for high risk follow-up due to her personal history of RIGHT breast ADH. Denies breast mass, skin changes, nipple discharge and pain. Breast history -  Referring - Nawaf Person MD - [facility]  2020 - breast MRI in [place] 3-4:00 a 6 mm lobulated mass birads 4.  2020 - RIGHT breast US at [facility] BI-RADS 4a  2020 - RIGHT breast 3 OC mass biopsy revealed sclerosing papilloma without atypia  2020 - Right breast, oriented excisional biopsy with needle localization - Dr. Albert Cole intraductal papillomas with benign epithelial hyperplasia. Atypical ductal hyperplasia, focal.   Fibrocystic changes including columnar cell change, usual ductal   hyperplasia and benign calcifications. Negative for carcinoma. Family history -  Maternal aunt diagnosed with breast cancer in her [de-identified] and  from it. Maternal aunt diagnosed with breast cancer in her 52's. OB History    No obstetric history on file. Obstetric Comments   Menarche:  14.5. LMP: ? Has IUD. # of Children:  3. Age at Delivery of First Child:  30.   Hysterectomy/oophorectomy:  NO/NO. Breast Bx:  yes. Hx of Breast Feeding:  yes. BCP:  yes. Hormone therapy:  no.                      Past Surgical History:   Procedure Laterality Date    HX BLEPHAROPLASTY Bilateral 2018    HX BREAST BIOPSY Right 2020    RIGHT BREAST EXCISIONAL BIOPSY WITH RIGHT NEEDLE LOCALIZATION performed by Anjana Goncalves MD at [facility] AMBULATORY OR    [place] NASAL SURG [address] UNLISTED  2011    septal reconstuction    [place] OPEN RX LEFORTE I  2008    upper jaw surgery           ROS    Physical Exam  Constitutional:       Appearance: Normal appearance. Chest:   Breasts:     Right: No mass, nipple discharge, skin change or tenderness. Left: No mass, nipple discharge, skin change or tenderness.       Comments: Dense, fibrocystic tissue bilaterally   Musculoskeletal:      Comments: FROM - UE x 2   Lymphadenopathy:      Upper Body:      Right upper body: No supraclavicular or axillary adenopathy. Left upper body: No supraclavicular or axillary adenopathy. Neurological:      Mental Status: She is alert. Psychiatric:         Attention and Perception: Attention normal.         Mood and Affect: Mood normal.         Speech: Speech normal.         Behavior: Behavior normal.       Visit Vitals  BP (!) 115/59 (BP 1 Location: Right arm, BP Patient Position: Sitting, BP Cuff Size: Small adult)   Pulse 77   Ht 5' 4.5\" (1.638 m)   Wt 153 lb (69.4 kg)   BMI 25.86 kg/m²       ASSESSMENT and PLAN  Encounter Diagnoses   Name Primary? At high risk for breast cancer Yes    Fibrocystic breast changes of both breasts     Family history of breast cancer        Normal exam with no evidence of malignancy. Mammogram - done at [facility] [place] Had done this summer with a call back and reports 6 month follow-up mammogram to be done in early 2023. Breast MRI in 2/2022 - has done at [facility]. Order given for breast MRI in 2023. RTC in 1 year or sooner PRN. She is comfortable with this plan. All questions answered and she stated understanding. Total time spent for this patient - 20 minutes.

## 2023-03-02 ENCOUNTER — PATIENT MESSAGE (OUTPATIENT)
Dept: SURGERY | Age: 53
End: 2023-03-02

## 2023-12-05 ENCOUNTER — OFFICE VISIT (OUTPATIENT)
Age: 53
End: 2023-12-05
Payer: COMMERCIAL

## 2023-12-05 VITALS — WEIGHT: 153 LBS | HEIGHT: 65 IN | BODY MASS INDEX: 25.49 KG/M2

## 2023-12-05 DIAGNOSIS — N60.11 FIBROCYSTIC BREAST CHANGES OF BOTH BREASTS: Primary | ICD-10-CM

## 2023-12-05 DIAGNOSIS — Z80.3 FAMILY HISTORY OF MALIGNANT NEOPLASM OF BREAST: ICD-10-CM

## 2023-12-05 DIAGNOSIS — Z91.89 AT HIGH RISK FOR BREAST CANCER: ICD-10-CM

## 2023-12-05 DIAGNOSIS — N60.12 FIBROCYSTIC BREAST CHANGES OF BOTH BREASTS: Primary | ICD-10-CM

## 2023-12-05 PROCEDURE — 99213 OFFICE O/P EST LOW 20 MIN: CPT | Performed by: NURSE PRACTITIONER

## 2023-12-05 NOTE — PROGRESS NOTES
HISTORY OF PRESENT ILLNESS  Sandra Hung is a 47 y.o. female     HPI Established patient presents for high risk follow-up due to her personal history of RIGHT breast ADH. Denies breast mass, skin changes, nipple discharge and pain. Breast history -  Referring - Efra Figueroa MD - [place] [address]  2020 - breast MRI in [place] 3-4:00 a 6 mm lobulated mass birads 4.  2020 - RIGHT breast US at [place] [address] BI-RADS 4a  2020 - RIGHT breast 3 OC mass biopsy revealed sclerosing papilloma without atypia  2020 - Right breast, oriented excisional biopsy with needle localization - Dr. Isla Canavan intraductal papillomas with benign epithelial hyperplasia. Atypical ductal hyperplasia, focal.   Fibrocystic changes including columnar cell change, usual ductal   hyperplasia and benign calcifications. Negative for carcinoma. Family history -  Maternal aunt diagnosed with breast cancer in her 80's and  from it. Maternal aunt diagnosed with breast cancer in her 52's. OB History    No obstetric history on file. Obstetric Comments   Menarche:  14.5. LMP: ? Has IUD. # of Children:  3. Age at Delivery of First Child:  30.   Hysterectomy/oophorectomy:  NO/NO. Breast Bx:  yes. Hx of Breast Feeding:  yes. BCP:  yes. Hormone therapy:  no.                    Past Surgical History:   Procedure Laterality Date    BLEPHAROPLASTY Bilateral 2018    BREAST BIOPSY Right 2020    RIGHT BREAST EXCISIONAL BIOPSY WITH RIGHT NEEDLE LOCALIZATION performed by Dilma Thakkar MD at [address] [place] [address] UNLISTED  2011    septal reconstuction    US GUIDED NEEDLE LOC OF RIGHT BREAST Right 2020    US GUIDED NEEDLE LOC OF RIGHT BREAST REHABILITATION [facility] AMB HISTORICAL         Review of Systems      Physical Exam  Constitutional:       Appearance: Normal appearance. Chest:   Breasts:     Right: No mass, nipple discharge, skin change or tenderness.       Left: No mass, nipple discharge, skin

## 2025-03-10 NOTE — PROGRESS NOTES
discharge, skin change or tenderness.   Musculoskeletal:      Comments: FROM - UE x 2   Lymphadenopathy:      Upper Body:      Right upper body: No supraclavicular or axillary adenopathy.      Left upper body: No supraclavicular or axillary adenopathy.   Neurological:      Mental Status: She is alert.   Psychiatric:         Attention and Perception: Attention normal.         Mood and Affect: Mood normal.         Speech: Speech normal.         Behavior: Behavior normal.            ASSESSMENT and PLAN   Diagnosis Orders   1. At high risk for breast cancer  MRI BREAST BILATERAL W WO CONTRAST      2. Fibrocystic breast changes of both breasts        3. Family history of malignant neoplasm of breast            Normal exam with no evidence of malignancy.   Mammogram - done at [facility] 9/24/24 - BIRADS 2.   Breast MRI done at [facility] this spring.  Order given for breast MRI.  RTC in 1 year or sooner PRN. She is comfortable with this plan.  All questions answered and she stated understanding.          Total time spent for this patient - 20 minutes.

## 2025-03-11 ENCOUNTER — OFFICE VISIT (OUTPATIENT)
Age: 55
End: 2025-03-11
Payer: COMMERCIAL

## 2025-03-11 VITALS — WEIGHT: 149 LBS | BODY MASS INDEX: 24.83 KG/M2 | HEIGHT: 65 IN

## 2025-03-11 DIAGNOSIS — N60.11 FIBROCYSTIC BREAST CHANGES OF BOTH BREASTS: ICD-10-CM

## 2025-03-11 DIAGNOSIS — Z91.89 AT HIGH RISK FOR BREAST CANCER: Primary | ICD-10-CM

## 2025-03-11 DIAGNOSIS — Z80.3 FAMILY HISTORY OF MALIGNANT NEOPLASM OF BREAST: ICD-10-CM

## 2025-03-11 DIAGNOSIS — N60.12 FIBROCYSTIC BREAST CHANGES OF BOTH BREASTS: ICD-10-CM

## 2025-03-11 PROCEDURE — 99213 OFFICE O/P EST LOW 20 MIN: CPT | Performed by: NURSE PRACTITIONER

## (undated) DEVICE — COVER LT HNDL PLAS RIG 1 PER PK

## (undated) DEVICE — SUT SLK 2-0SH 30IN BLK --

## (undated) DEVICE — 3M™ TEGADERM™ TRANSPARENT FILM DRESSING FRAME STYLE, 1626W, 4 IN X 4-3/4 IN (10 CM X 12 CM), 50/CT 4CT/CASE: Brand: 3M™ TEGADERM™

## (undated) DEVICE — INFECTION CONTROL KIT SYS

## (undated) DEVICE — PREP SKN CHLRAPRP APL 26ML STR --

## (undated) DEVICE — 1010 S-DRAPE TOWEL DRAPE 10/BX: Brand: STERI-DRAPE™

## (undated) DEVICE — SUTURE MCRYL SZ 4-0 L27IN ABSRB UD L19MM PS-2 1/2 CIR PRIM Y426H

## (undated) DEVICE — TOWEL SURG W17XL27IN STD BLU COT NONFENESTRATED PREWASHED

## (undated) DEVICE — TRAP FLUID BUFFALO FLTR

## (undated) DEVICE — INSULATED BLADE ELECTRODE: Brand: EDGE

## (undated) DEVICE — SOL IRRIGATION INJ NACL 0.9% 500ML BTL

## (undated) DEVICE — REM POLYHESIVE ADULT PATIENT RETURN ELECTRODE: Brand: VALLEYLAB

## (undated) DEVICE — NEEDLE HYPO 22GA L1.5IN BLK S STL HUB POLYPR SHLD REG BVL

## (undated) DEVICE — 3M™ TEGADERM™ TRANSPARENT FILM DRESSING FRAME STYLE, 1624W, 2-3/8 IN X 2-3/4 IN (6 CM X 7 CM), 100/CT 4CT/CASE: Brand: 3M™ TEGADERM™

## (undated) DEVICE — SMOKE EVACUATION PENCIL: Brand: VALLEYLAB

## (undated) DEVICE — SOLUTION LACTATED RINGERS INJECTION USP

## (undated) DEVICE — CHEST/BREAST-LF: Brand: MEDLINE INDUSTRIES, INC.

## (undated) DEVICE — BOWL UTIL GRAD 32OZ STRL --

## (undated) DEVICE — SYR 10ML LUER LOK 1/5ML GRAD --

## (undated) DEVICE — CONTINU-FLO SOLUTION SET, 2 INJECTION SITES, MALE LUER LOCK ADAPTER WITH RETRACTABLE COLLAR, LARGE BORE STOPCOCK WITH ROTATING MALE LUER LOCK EXTENSION SET, 2 INJECTION SITES, MALE LUER LOCK ADAPTER WITH RETRACTABLE COLLAR: Brand: INTERLINK/CONTINU-FLO

## (undated) DEVICE — INTENDED FOR TISSUE SEPARATION, AND OTHER PROCEDURES THAT REQUIRE A SHARP SURGICAL BLADE TO PUNCTURE OR CUT.: Brand: BARD-PARKER ® CARBON RIB-BACK BLADES

## (undated) DEVICE — DRAPE PRB US TRNSDCR 6X96IN --

## (undated) DEVICE — GOWN,SIRUS,FABRNF,XL,20/CS: Brand: MEDLINE

## (undated) DEVICE — STERILE POLYISOPRENE POWDER-FREE SURGICAL GLOVES WITH EMOLLIENT COATING: Brand: PROTEXIS

## (undated) DEVICE — NEEDLE HYPO 25GA L1.5IN BVL ORIENTED ECLIPSE

## (undated) DEVICE — SUTURE VCRL SZ 3-0 L27IN ABSRB UD L26MM SH 1/2 CIR J416H

## (undated) DEVICE — DERMABOND SKIN ADH 0.7ML -- DERMABOND ADVANCED 12/BX

## (undated) DEVICE — NON-ADHERENT DRESSING: Brand: TELFA

## (undated) DEVICE — KENDALL DL ECG CABLE AND LEAD WIRE SYSTEM, 3-LEAD, SINGLE PATIENT USE: Brand: KENDALL